# Patient Record
Sex: FEMALE | Race: WHITE | Employment: FULL TIME | URBAN - METROPOLITAN AREA
[De-identification: names, ages, dates, MRNs, and addresses within clinical notes are randomized per-mention and may not be internally consistent; named-entity substitution may affect disease eponyms.]

---

## 2018-08-29 PROBLEM — R29.6 FALLS FREQUENTLY: Status: ACTIVE | Noted: 2018-08-29

## 2018-08-29 PROBLEM — R22.1 NECK FULLNESS: Status: ACTIVE | Noted: 2018-08-29

## 2018-08-29 PROBLEM — R55 SYNCOPE: Status: ACTIVE | Noted: 2018-08-29

## 2018-08-29 PROBLEM — F33.0 MILD EPISODE OF RECURRENT MAJOR DEPRESSIVE DISORDER (HCC): Status: ACTIVE | Noted: 2018-08-29

## 2018-08-29 PROBLEM — F51.01 PRIMARY INSOMNIA: Status: ACTIVE | Noted: 2018-08-29

## 2018-09-13 ENCOUNTER — HOSPITAL ENCOUNTER (OUTPATIENT)
Dept: ULTRASOUND IMAGING | Age: 68
Discharge: HOME | End: 2018-09-13
Attending: NURSE PRACTITIONER
Payer: MEDICARE

## 2018-09-13 ENCOUNTER — HOSPITAL ENCOUNTER (OUTPATIENT)
Dept: MRI IMAGING | Age: 68
Discharge: HOME | End: 2018-09-13
Attending: NURSE PRACTITIONER
Payer: MEDICARE

## 2018-09-13 DIAGNOSIS — R29.6 FALLS FREQUENTLY: ICD-10-CM

## 2018-09-13 DIAGNOSIS — R55 SYNCOPE, UNSPECIFIED SYNCOPE TYPE: ICD-10-CM

## 2018-09-13 DIAGNOSIS — R22.1 NECK FULLNESS: ICD-10-CM

## 2018-09-13 PROCEDURE — 70551 MRI BRAIN STEM W/O DYE: CPT

## 2018-09-13 PROCEDURE — 93880 EXTRACRANIAL BILAT STUDY: CPT

## 2018-09-13 PROCEDURE — 76536 US EXAM OF HEAD AND NECK: CPT

## 2018-09-14 PROBLEM — E04.1 THYROID NODULE: Status: ACTIVE | Noted: 2018-09-14

## 2018-09-14 PROBLEM — E78.2 MIXED HYPERLIPIDEMIA: Status: ACTIVE | Noted: 2018-09-14

## 2018-09-27 NOTE — PROGRESS NOTES
Patient pre-assessment complete for Tilt Table Test with Dr Ana Pryor scheduled for 18 at 7:30am, arrival time 6:30am. Patient verified using . Spoke with patients , limited pre-assessment completed. Patient instructed to bring all home medications in labeled bottles on the day of procedure. NPO status reinforced. Patient verbalizes understanding of all instructions & denies any questions at this time.

## 2018-09-28 ENCOUNTER — HOSPITAL ENCOUNTER (OUTPATIENT)
Dept: NON INVASIVE DIAGNOSTICS | Age: 68
Discharge: HOME | End: 2018-09-28
Attending: INTERNAL MEDICINE | Admitting: INTERNAL MEDICINE
Payer: MEDICARE

## 2018-09-28 VITALS
HEART RATE: 71 BPM | RESPIRATION RATE: 18 BRPM | OXYGEN SATURATION: 96 % | BODY MASS INDEX: 29.26 KG/M2 | HEIGHT: 62 IN | SYSTOLIC BLOOD PRESSURE: 172 MMHG | DIASTOLIC BLOOD PRESSURE: 84 MMHG | WEIGHT: 159 LBS

## 2018-09-28 LAB
ANION GAP SERPL CALC-SCNC: 7 MMOL/L (ref 7–16)
ATRIAL RATE: 61 BPM
BUN SERPL-MCNC: 16 MG/DL (ref 8–23)
CALCIUM SERPL-MCNC: 8.2 MG/DL (ref 8.3–10.4)
CALCULATED P AXIS, ECG09: 50 DEGREES
CALCULATED R AXIS, ECG10: -7 DEGREES
CALCULATED T AXIS, ECG11: 9 DEGREES
CHLORIDE SERPL-SCNC: 107 MMOL/L (ref 98–107)
CO2 SERPL-SCNC: 28 MMOL/L (ref 21–32)
CREAT SERPL-MCNC: 0.9 MG/DL (ref 0.6–1)
DIAGNOSIS, 93000: NORMAL
ERYTHROCYTE [DISTWIDTH] IN BLOOD BY AUTOMATED COUNT: 14.6 %
GLUCOSE SERPL-MCNC: 91 MG/DL (ref 65–100)
HCT VFR BLD AUTO: 42.5 % (ref 35.8–46.3)
HGB BLD-MCNC: 13.4 G/DL (ref 11.7–15.4)
INR PPP: 1
MCH RBC QN AUTO: 26.3 PG (ref 26.1–32.9)
MCHC RBC AUTO-ENTMCNC: 31.5 G/DL (ref 31.4–35)
MCV RBC AUTO: 83.3 FL (ref 79.6–97.8)
NRBC # BLD: 0 K/UL (ref 0–0.2)
P-R INTERVAL, ECG05: 130 MS
PLATELET # BLD AUTO: 360 K/UL (ref 150–450)
PMV BLD AUTO: 9.8 FL (ref 9.4–12.3)
POTASSIUM SERPL-SCNC: 3.6 MMOL/L (ref 3.5–5.1)
PROTHROMBIN TIME: 12.7 SEC (ref 11.5–14.5)
Q-T INTERVAL, ECG07: 448 MS
QRS DURATION, ECG06: 84 MS
QTC CALCULATION (BEZET), ECG08: 450 MS
RBC # BLD AUTO: 5.1 M/UL (ref 4.05–5.2)
SODIUM SERPL-SCNC: 142 MMOL/L (ref 136–145)
VENTRICULAR RATE, ECG03: 61 BPM
WBC # BLD AUTO: 6.3 K/UL (ref 4.3–11.1)

## 2018-09-28 PROCEDURE — 85027 COMPLETE CBC AUTOMATED: CPT

## 2018-09-28 PROCEDURE — 80048 BASIC METABOLIC PNL TOTAL CA: CPT

## 2018-09-28 PROCEDURE — 93005 ELECTROCARDIOGRAM TRACING: CPT | Performed by: INTERNAL MEDICINE

## 2018-09-28 PROCEDURE — 93660 TILT TABLE EVALUATION: CPT

## 2018-09-28 PROCEDURE — 85610 PROTHROMBIN TIME: CPT

## 2018-09-28 RX ORDER — NITROGLYCERIN 0.4 MG/1
0.4 TABLET SUBLINGUAL
Status: DISCONTINUED | OUTPATIENT
Start: 2018-09-28 | End: 2018-09-28

## 2018-09-28 RX ORDER — SODIUM CHLORIDE 9 MG/ML
75 INJECTION, SOLUTION INTRAVENOUS CONTINUOUS
Status: DISCONTINUED | OUTPATIENT
Start: 2018-09-28 | End: 2018-09-28

## 2018-09-28 NOTE — PROCEDURES
Procedure: Tilt Table Monitoring without drug provocation    Referring Physician: Joana Chow DO  Performing Physician: Placido Coffey MD      Indication: See history and physical for complete details. Briefly, procedure is being performed for a history of dizziness/near syncope. Assistant:     PROCEDURE:  Patient was monitored in the supine position for 10 minutes, then in the 70 degree head upright tilt position for 4 minutes. She immediately noted light headed feeling upon assuming upright posture. This improved but was followed in relatively quick succession by nausea and a feeling of pre syncope. Patient was returned to the supine position with improvement in symptoms. FINDINGS: Baseline bp and heart rate ranged from , and 170-190/80-90's and 60's and 70's, respectively. With upright tilt there was no significant change of hemodynamics but patient experienced the symptoms noted above    IMPRESSION:    1. Reproduction of symptoms with upright tilt. 2. No hemodynamic change  3. Resting hypertension, maintained throughout procedure          RECOMMENDATIONS:  1. Complete remainder of cardiovascular evaluation with ambulatory monitor (she will go to office today to switch out wireless monitor for wired version for 14 days due to lack of cellular signal where she lives), and echocardiogram.  2. Patient has a history of Meniere's disease. Current symptoms are becoming more apparently vertigo rather than hemodynamic instability. She has had essentially normal MRI of the brain. Consider follow up with ENT. 3. Continue conservative measures which include increased water intake, which she notes has improved her overall sense of well being. 4. Should BP remain elevated at follow up she will need antihypertensive medication though with caution given prior hypotension thereupon. FOLLOW UP:  With me after monitor and echo as planned.      Placido Coffey MD  8:34 AM

## 2018-09-28 NOTE — PROGRESS NOTES
I have reviewed discharge instructions with the patient and spouse. The patient and spouse verbalized understanding. Pt ambulatory with steady gait out of department accompanied by spouse.

## 2018-09-28 NOTE — DISCHARGE INSTRUCTIONS
After your tilt table test    Be sure someone else drives you home. You have no restrictions:   Return to your previous diet. **** Increase your fluid & salt intake   Return to your previous activities. Continue current medications       Call your doctor if:    · You have trouble breathing all of a sudden. · You have chest pain or any pain that spreads to your neck, jaw, or arms. · You have questions or concerns. · You have a fever greater than 101°F.  · You have increase in dizzy or fainting spells    Doctor:  Brian: [phone]    Special Instructions: none     ***

## 2018-09-28 NOTE — PROGRESS NOTES
Patient received to [address] [place] [address] room # 8  Ambulatory from [facility]. Patient scheduled for Tilt Table today with Dr Manisha Lovelace. Procedure reviewed & questions answered, voiced good understanding consent obtained & placed on chart. All medications and medical history reviewed. Will prep patient per orders. Patient & family updated on plan of care. The patient has a fraility score of 2-WELL, based on patient A&Ox3, patient able to ambulate to room without difficulty, patient able to perform ADL's independently.

## 2018-09-28 NOTE — IP AVS SNAPSHOT
[address] 
[phone] Patient: Giselle Snow MRN: [id number] Discharge Summary 9/28/2018 Giselle Snow MRN[de-identified]  [id number] Admission Information Provider Pager Service Admission Date Expected D/C Date Karen Matta MD  CARDIAC CATH LAB 9/28/2018 Actual LOS Patient Class 0 days OUTPATIENT Follow-up Information None My Medications Notice You have not been prescribed any medications. General Information Please provide this summary of care documentation to your next provider. Allergies High:  Lisinopril Unspecified:  Crab; Shrimp Current Immunizations  Never Reviewed No immunizations on file. Discharge Instructions Discharge Instructions After your tilt table test 
 
Be sure someone else drives you home. You have no restrictions:  
Return to your previous diet. **** Increase your fluid & salt intake Return to your previous activities. Continue current medications Call your doctor if: 
 
· You have trouble breathing all of a sudden. · You have chest pain or any pain that spreads to your neck, jaw, or arms. · You have questions or concerns. · You have a fever greater than 101°F. 
· You have increase in dizzy or fainting spells Doctor: Brian: [phone] Special Instructions: none *** Discharge Orders None  
  
` Patient Signature:  ____________________________________________________________ Date:  ____________________________________________________________  
  
 [facility] Pool Provider Signature:  ____________________________________________________________ Date:  ____________________________________________________________

## 2018-10-09 PROBLEM — I35.1 NONRHEUMATIC AORTIC VALVE INSUFFICIENCY: Status: ACTIVE | Noted: 2018-10-09

## 2018-10-17 PROBLEM — R00.2 PALPITATIONS: Status: ACTIVE | Noted: 2018-10-17

## 2018-11-14 PROBLEM — I10 HYPERTENSION: Status: ACTIVE | Noted: 2018-11-14

## 2021-07-12 PROBLEM — R19.00 MASS OF SOFT TISSUE OF ABDOMEN: Status: ACTIVE | Noted: 2021-07-12

## 2021-07-22 ENCOUNTER — HOSPITAL ENCOUNTER (OUTPATIENT)
Dept: CT IMAGING | Age: 71
Discharge: HOME | End: 2021-07-22
Attending: SURGERY
Payer: MEDICARE

## 2021-07-22 DIAGNOSIS — R19.00 MASS OF SOFT TISSUE OF ABDOMEN: ICD-10-CM

## 2021-07-22 LAB — CREAT BLD-MCNC: 1.11 MG/DL (ref 0.8–1.5)

## 2021-07-22 PROCEDURE — 74011000258 HC RX REV CODE- 258: Performed by: SURGERY

## 2021-07-22 PROCEDURE — 74177 CT ABD & PELVIS W/CONTRAST: CPT

## 2021-07-22 PROCEDURE — 74011000636 HC RX REV CODE- 636: Performed by: SURGERY

## 2021-07-22 PROCEDURE — 82565 ASSAY OF CREATININE: CPT

## 2021-07-22 RX ORDER — SODIUM CHLORIDE 0.9 % (FLUSH) 0.9 %
10 SYRINGE (ML) INJECTION
Status: COMPLETED | OUTPATIENT
Start: 2021-07-22 | End: 2021-07-22

## 2021-07-22 RX ADMIN — DIATRIZOATE MEGLUMINE AND DIATRIZOATE SODIUM 15 ML: 660; 100 LIQUID ORAL; RECTAL at 09:46

## 2021-07-22 RX ADMIN — Medication 10 ML: at 09:46

## 2021-07-22 RX ADMIN — SODIUM CHLORIDE 100 ML: 900 INJECTION, SOLUTION INTRAVENOUS at 09:46

## 2021-07-22 RX ADMIN — IOPAMIDOL 100 ML: 755 INJECTION, SOLUTION INTRAVENOUS at 09:46
